# Patient Record
Sex: FEMALE
[De-identification: names, ages, dates, MRNs, and addresses within clinical notes are randomized per-mention and may not be internally consistent; named-entity substitution may affect disease eponyms.]

---

## 2020-06-24 ENCOUNTER — HOSPITAL ENCOUNTER (OUTPATIENT)
Dept: HOSPITAL 92 - BICMAMMO | Age: 55
Discharge: HOME | End: 2020-06-24
Attending: OBSTETRICS & GYNECOLOGY
Payer: COMMERCIAL

## 2020-06-24 DIAGNOSIS — N64.89: ICD-10-CM

## 2020-06-24 DIAGNOSIS — R92.8: Primary | ICD-10-CM

## 2020-06-24 PROCEDURE — G0279 TOMOSYNTHESIS, MAMMO: HCPCS

## 2020-06-24 NOTE — MMO
Right Breast MAMMO Unilat Diag DDI RT+KAMILA.

 

CLINICAL HISTORY:

Patient is 54 years old and is seen for diagnostic exam. The patient has no

family history of breast cancer.  The patient has no personal history of cancer.

The patient has a history of left Stereotatic Biopsy in August, 2011 - benign -

Adipose tissue.

 

VIEWS:

The views performed were:  right craniocaudal spot compression with

tomosynthesis; right mediolateral oblique spot compression with tomosynthesis;

and right mediolateral with tomosynthesis.

 

FILMS COMPARED:

The present examination has been compared to prior imaging studies performed at

[facility] on 03/11/2019 and 06/16/2020, and at [facility]

on 08/20/2015 and 06/24/2020.

 

This study has been interpreted with the assistance of computer-aided detection.

 

MAMMOGRAM FINDINGS:

The breast is heterogeneously dense, which could obscure a lesion on mammography.

 

There is an asymmetry seen in the MLO view only seen in the posterior upper

region of the right breast.

 

The area of concern on the CC view on the screening mammogram does not persist.

Sonography in the region of concern on the  MLO view reveals no concerning

findings.

 

IMPRESSION:

ASYMMETRY IN THE RIGHT BREAST IS PROBABLY BENIGN. FOLLOW-UP IN 6 MONTHS IS

RECOMMENDED.

 

THE RESULTS OF THIS EXAM WERE SENT TO THE PATIENT.

 

ACR BI-RADS Category 3 - Probably benign finding - short interval follow-up

suggested. [facility] will notify the patient of the need for additional

imaging services.

 

MAMMOGRAPHY NOTE:

 1. A negative mammogram report should not delay a biopsy if a dominant of

 clinically suspicious mass is present.

 2. Approximately 10% to 15% of breast cancers are not detected by

 mammography.

 3. Adenosis and dense breasts may obscure an underlying neoplasm.

 

 

Reported by: LEATHA CRONIN MD

Electonically Signed: [id number]

## 2020-06-24 NOTE — ULT
EXAM:

US Breast Limited Rt



PROVIDED CLINICAL HISTORY:

Abnormal mammogram



COMPARISON:

None



FINDINGS:

Limited sonographic interrogation in the region of mammographic concern of the right breast was perfo
rmed. Several clusters of cysts are seen at the 9:00 position of the right breast. No concerning

sonographic findings are evident to correspond with the mammographic finding.



IMPRESSION:

No concerning sonographic findings are evident. Six-month follow-up diagnostic mammogram for findings
 seen only mammographically recommended. Please see that report.



BI-RADS 3 -- probably benign, 6-month follow-up



Reported By: Lane Shirley 

Electronically Signed:  6/24/2020 2:08 PM

## 2021-08-11 ENCOUNTER — HOSPITAL ENCOUNTER (OUTPATIENT)
Dept: HOSPITAL 92 - BICMAMMO | Age: 56
Discharge: HOME | End: 2021-08-11
Attending: ADVANCED PRACTICE MIDWIFE
Payer: COMMERCIAL

## 2021-08-11 DIAGNOSIS — R92.8: Primary | ICD-10-CM

## 2021-08-11 PROCEDURE — G0279 TOMOSYNTHESIS, MAMMO: HCPCS

## 2021-08-11 PROCEDURE — 77066 DX MAMMO INCL CAD BI: CPT
